# Patient Record
Sex: FEMALE | Race: WHITE | NOT HISPANIC OR LATINO | URBAN - METROPOLITAN AREA
[De-identification: names, ages, dates, MRNs, and addresses within clinical notes are randomized per-mention and may not be internally consistent; named-entity substitution may affect disease eponyms.]

---

## 2021-06-18 ENCOUNTER — EMERGENCY (EMERGENCY)
Facility: HOSPITAL | Age: 21
LOS: 0 days | Discharge: HOME | End: 2021-06-19
Attending: EMERGENCY MEDICINE | Admitting: EMERGENCY MEDICINE
Payer: COMMERCIAL

## 2021-06-18 VITALS
TEMPERATURE: 98 F | OXYGEN SATURATION: 99 % | HEART RATE: 101 BPM | SYSTOLIC BLOOD PRESSURE: 132 MMHG | DIASTOLIC BLOOD PRESSURE: 79 MMHG | RESPIRATION RATE: 22 BRPM | WEIGHT: 255.96 LBS

## 2021-06-18 DIAGNOSIS — R05 COUGH: ICD-10-CM

## 2021-06-18 DIAGNOSIS — J45.901 UNSPECIFIED ASTHMA WITH (ACUTE) EXACERBATION: ICD-10-CM

## 2021-06-18 DIAGNOSIS — Z91.018 ALLERGY TO OTHER FOODS: ICD-10-CM

## 2021-06-18 DIAGNOSIS — R06.2 WHEEZING: ICD-10-CM

## 2021-06-18 PROCEDURE — 99284 EMERGENCY DEPT VISIT MOD MDM: CPT

## 2021-06-18 RX ORDER — ALBUTEROL 90 UG/1
2 AEROSOL, METERED ORAL ONCE
Refills: 0 | Status: COMPLETED | OUTPATIENT
Start: 2021-06-18 | End: 2021-06-18

## 2021-06-18 RX ADMIN — ALBUTEROL 2 PUFF(S): 90 AEROSOL, METERED ORAL at 22:47

## 2021-06-18 NOTE — ED ADULT TRIAGE NOTE - CHIEF COMPLAINT QUOTE
Pt BIBA c/o asthma exacerbation. As per EMS, pt [employer] worker, running around in [place] area and sudden onset asthma attack. Airway intact. b/l wheezes noted. Pt received 2 neb tx in the field.

## 2021-06-18 NOTE — ED ADULT NURSE NOTE - OBJECTIVE STATEMENT
Pt states had an asthma attack after she was in a [place] area while working. States sudden onset. Received 2 nebs in field given by EMS. Pt states has not used her inhaler in a while.  Pt stable at this time, no distress noted, speaks in full sentences,

## 2021-06-19 VITALS
OXYGEN SATURATION: 99 % | SYSTOLIC BLOOD PRESSURE: 121 MMHG | HEART RATE: 87 BPM | DIASTOLIC BLOOD PRESSURE: 70 MMHG | RESPIRATION RATE: 18 BRPM | TEMPERATURE: 98 F

## 2021-06-19 PROCEDURE — 71046 X-RAY EXAM CHEST 2 VIEWS: CPT | Mod: 26

## 2021-06-19 NOTE — ED PROVIDER NOTE - PATIENT PORTAL LINK FT
You can access the FollowMyHealth Patient Portal offered by [facility] by registering at the following website: [email or web address]. By joining [employer]’s FollowMyHealth portal, you will also be able to view your health information using other applications (apps) compatible with our system.

## 2021-06-19 NOTE — ED PROVIDER NOTE - OBJECTIVE STATEMENT
the pt is a 20y F w/ hx of asthma presenting with intermittent cough for several weeks and today an episode of non-stop cough and wheezing while at work. She called 911 and en-route received 2 Duoneb treatments. On arrival to the ED, feels much better. Denies fever, headache, chest pain, active SOB, N/V, abdominal pain, diarrhea. Of note, states she ran out and doesn't have her albuterol inhaler.

## 2021-06-19 NOTE — ED PROVIDER NOTE - NS ED ROS FT
Constitutional: (-) fever  Eyes/ENT: (-) blurry vision, (-) epistaxis  Cardiovascular: (-) chest pain, (-) syncope  Respiratory: (+) cough, (+) wheezing, (-) shortness of breath  Gastrointestinal: (-) vomiting, (-) diarrhea  Musculoskeletal: (-) neck pain, (-) back pain, (-) joint pain  Integumentary: (-) rash, (-) edema  Neurological: (-) headache, (-) altered mental status  Psychiatric: (-) hallucinations  Allergic/Immunologic: (-) pruritus

## 2021-06-19 NOTE — ED PROVIDER NOTE - ATTENDING CONTRIBUTION TO CARE
I personally evaluated the patient. I reviewed the Resident’s or Physician Assistant’s note (as assigned above), and agree with the findings and plan except as documented in my note.  Pt with hx of asthma with complaints of 6 wks of intermittent coughing. No other symptoms. Reports some wheezing today that improved after she received albuterol nebs from EMS. On exam, pt is well appearing, normal s1s2, lungs ctab. Plan is albuterol inhaler, nebs and reassess.

## 2021-06-19 NOTE — ED PROVIDER NOTE - CLINICAL SUMMARY MEDICAL DECISION MAKING FREE TEXT BOX
Pt with 6 wks of coughing. +asthma exacerbation today. Required inhaler and CXR. No acute findings. Will be d/vandana with outpt f/up.

## 2021-06-19 NOTE — ED PROVIDER NOTE - NSFOLLOWUPCLINICS_GEN_ALL_ED_FT
[facility]  Medicine  [address], [place]   Phone: [phone]  Fax:   Follow Up Time: 7-10 Days

## 2021-07-24 ENCOUNTER — EMERGENCY (EMERGENCY)
Facility: HOSPITAL | Age: 21
LOS: 0 days | Discharge: HOME | End: 2021-07-24
Attending: STUDENT IN AN ORGANIZED HEALTH CARE EDUCATION/TRAINING PROGRAM | Admitting: EMERGENCY MEDICINE
Payer: COMMERCIAL

## 2021-07-24 VITALS
DIASTOLIC BLOOD PRESSURE: 80 MMHG | HEART RATE: 70 BPM | RESPIRATION RATE: 18 BRPM | SYSTOLIC BLOOD PRESSURE: 128 MMHG | OXYGEN SATURATION: 98 % | TEMPERATURE: 98 F

## 2021-07-24 DIAGNOSIS — J45.21 MILD INTERMITTENT ASTHMA WITH (ACUTE) EXACERBATION: ICD-10-CM

## 2021-07-24 DIAGNOSIS — R06.02 SHORTNESS OF BREATH: ICD-10-CM

## 2021-07-24 DIAGNOSIS — Z91.030 BEE ALLERGY STATUS: ICD-10-CM

## 2021-07-24 PROCEDURE — 99284 EMERGENCY DEPT VISIT MOD MDM: CPT

## 2021-07-24 RX ORDER — ALBUTEROL 90 UG/1
4 AEROSOL, METERED ORAL ONCE
Refills: 0 | Status: COMPLETED | OUTPATIENT
Start: 2021-07-24 | End: 2021-07-24

## 2021-07-24 RX ORDER — ALBUTEROL 90 UG/1
2 AEROSOL, METERED ORAL
Qty: 1 | Refills: 0
Start: 2021-07-24

## 2021-07-24 RX ORDER — DEXAMETHASONE 0.5 MG/5ML
10 ELIXIR ORAL ONCE
Refills: 0 | Status: COMPLETED | OUTPATIENT
Start: 2021-07-24 | End: 2021-07-24

## 2021-07-24 RX ADMIN — Medication 10 MILLIGRAM(S): at 10:12

## 2021-07-24 RX ADMIN — ALBUTEROL 4 PUFF(S): 90 AEROSOL, METERED ORAL at 10:01

## 2021-07-24 NOTE — ED PROVIDER NOTE - CARE PROVIDER_API CALL
Jase Eagle)  Critical Care Medicine; Pulmonary Disease; Sleep Medicine  [address] [place] [address]  Phone: [phone]  Fax: [phone]  Follow Up Time: 7-10 Days

## 2021-07-24 NOTE — ED PEDIATRIC NURSE NOTE - PRO INTERPRETER NEED 2
----- Message from Hayde Sidhu sent at 9/30/2020 11:53 AM CDT -----  .Type:  Sooner Apoointment Request    Caller is requesting a sooner appointment.  Caller declined first available appointment listed below.  Caller will not accept being placed on the waitlist and is requesting a message be sent to doctor.  Name of Caller:Cristina Saini  When is the first available appointment?01/07/2021  Symptoms: kidney/abnormal labs  Would the patient rather a call back or a response via [facility]? Call back  Best Call Back Number: [phone]  Additional Information:        English

## 2021-07-24 NOTE — ED PROVIDER NOTE - CLINICAL SUMMARY MEDICAL DECISION MAKING FREE TEXT BOX
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 No other symptoms. No nebs via EMS, patient denies any complaints at this time. Patient states inhaler at home needs refill. On exam, pt is well appearing, normal s1s2, lungs ctab. Plan is albuterol inhaler, decadron and albuterol inhaler script sent to pharmacy and follow up with medicine clinic.

## 2021-07-24 NOTE — ED PROVIDER NOTE - NS ED ROS FT
Consitutional: No fever, chills,   Cardiac:  No chest pain,  Respiratory: +cough +sob  GI:  No abdominal pain, nausea, vomiting diarrhea  Neuro:  No headache   Skin:  No skin rash

## 2021-07-24 NOTE — ED PROVIDER NOTE - OBJECTIVE STATEMENT
20yoF w/ pmhx of asthma who bibems for asthma excerbation 1hr PTA while working at [employer]. no recent illness, fever chills nausea vomiting cp. she had difficulty breathing while working, called EMS. she had sob before but it has since improved without intervention. she is requesting an albuterol pump here because she has not pmd since moving to [place] for past 1 year. she only uses albuterol for her asthma as needed, no maintenance therapy, never admitted to hospital for asthma. 20yoF w/ pmhx of asthma who bibems for asthma excerbation 1hr PTA while working at [employer]. no recent illness, fever chills nausea vomiting cp. she had difficulty breathing while working, called EMS. she had sob before but it has since improved without intervention. she is requesting an albuterol pump here because she has not pmd since moving to [place] for past 1 year. she only uses albuterol for her asthma as needed, no maintenance therapy, never admitted to hospital for asthma. she has dry cough for past few weeks as well.

## 2021-07-24 NOTE — ED PROVIDER NOTE - PATIENT PORTAL LINK FT
You can access the FollowMyHealth Patient Portal offered by [facility] by registering at the following website: [email or web address]. By joining [employer]’s FollowMyHealth portal, you will also be able to view your health information using other applications (apps) compatible with our system.

## 2021-07-24 NOTE — ED PROVIDER NOTE - PHYSICAL EXAMINATION
CONSTITUTIONAL: well appearing  SKIN: warm, dr  ENT: No nasal discharge  CARD: RRR  RESP: No wheezes, rales or rhonchi ctab, good air movement no accessory muscle use  ABD: soft ntnd  EXT: No cyanosis or edema  NEURO: Alert, oriented, grossly unremarkable  PSYCH: Cooperative, appropriate CONSTITUTIONAL: well appearing  SKIN: warm, dry  ENT: No nasal discharge  CARD: RRR  RESP: No wheezes, rales or rhonchi ctab, good air movement no accessory muscle use  ABD: soft ntnd  EXT: No cyanosis or edema  NEURO: Alert, oriented, grossly unremarkable  PSYCH: Cooperative, appropriate

## 2021-07-24 NOTE — ED PROVIDER NOTE - NSFOLLOWUPCLINICS_GEN_ALL_ED_FT
[facility]  Medicine  [address], [place]   Phone: [phone]  Fax:   Follow Up Time: 4-6 Days

## 2021-12-29 NOTE — ED ADULT NURSE NOTE - TEMPLATE
· Hemoglobin 10 8 on admission  · Likely multifactorial in setting of iron deficiency anemia vs CKD  · Baseline appears to be around 11 5 - 13  · Concurrently with no signs of active bleeding  · Continue home iron supplementation  · Transfuse for hemoglobin < 7 or with hemodynamic compromise  · Trend hemoglobin and monitor for signs of active bleeding General

## 2022-03-03 ENCOUNTER — EMERGENCY (EMERGENCY)
Facility: HOSPITAL | Age: 22
LOS: 0 days | Discharge: HOME | End: 2022-03-04
Attending: EMERGENCY MEDICINE | Admitting: EMERGENCY MEDICINE
Payer: COMMERCIAL

## 2022-03-03 VITALS
SYSTOLIC BLOOD PRESSURE: 134 MMHG | WEIGHT: 289.91 LBS | RESPIRATION RATE: 17 BRPM | TEMPERATURE: 98 F | DIASTOLIC BLOOD PRESSURE: 86 MMHG | HEART RATE: 94 BPM | OXYGEN SATURATION: 98 %

## 2022-03-03 DIAGNOSIS — I45.10 UNSPECIFIED RIGHT BUNDLE-BRANCH BLOCK: ICD-10-CM

## 2022-03-03 DIAGNOSIS — Z20.822 CONTACT WITH AND (SUSPECTED) EXPOSURE TO COVID-19: ICD-10-CM

## 2022-03-03 DIAGNOSIS — R05.9 COUGH, UNSPECIFIED: ICD-10-CM

## 2022-03-03 DIAGNOSIS — Z91.030 BEE ALLERGY STATUS: ICD-10-CM

## 2022-03-03 DIAGNOSIS — R06.02 SHORTNESS OF BREATH: ICD-10-CM

## 2022-03-03 DIAGNOSIS — J45.901 UNSPECIFIED ASTHMA WITH (ACUTE) EXACERBATION: ICD-10-CM

## 2022-03-03 LAB
ALBUMIN SERPL ELPH-MCNC: 4.5 G/DL — SIGNIFICANT CHANGE UP (ref 3.5–5.2)
ALP SERPL-CCNC: 72 U/L — SIGNIFICANT CHANGE UP (ref 30–115)
ALT FLD-CCNC: 16 U/L — SIGNIFICANT CHANGE UP (ref 0–41)
ANION GAP SERPL CALC-SCNC: 11 MMOL/L — SIGNIFICANT CHANGE UP (ref 7–14)
AST SERPL-CCNC: 15 U/L — SIGNIFICANT CHANGE UP (ref 0–41)
BASOPHILS # BLD AUTO: 0.06 K/UL — SIGNIFICANT CHANGE UP (ref 0–0.2)
BASOPHILS NFR BLD AUTO: 0.5 % — SIGNIFICANT CHANGE UP (ref 0–1)
BILIRUB SERPL-MCNC: 0.2 MG/DL — SIGNIFICANT CHANGE UP (ref 0.2–1.2)
BUN SERPL-MCNC: 8 MG/DL — LOW (ref 10–20)
CALCIUM SERPL-MCNC: 8.9 MG/DL — SIGNIFICANT CHANGE UP (ref 8.5–10.1)
CHLORIDE SERPL-SCNC: 102 MMOL/L — SIGNIFICANT CHANGE UP (ref 98–110)
CO2 SERPL-SCNC: 22 MMOL/L — SIGNIFICANT CHANGE UP (ref 17–32)
CREAT SERPL-MCNC: 0.5 MG/DL — LOW (ref 0.7–1.5)
EGFR: 137 ML/MIN/1.73M2 — SIGNIFICANT CHANGE UP
EOSINOPHIL # BLD AUTO: 0.57 K/UL — SIGNIFICANT CHANGE UP (ref 0–0.7)
EOSINOPHIL NFR BLD AUTO: 4.4 % — SIGNIFICANT CHANGE UP (ref 0–8)
GLUCOSE SERPL-MCNC: 86 MG/DL — SIGNIFICANT CHANGE UP (ref 70–99)
HCG SERPL QL: NEGATIVE — SIGNIFICANT CHANGE UP
HCT VFR BLD CALC: 36.7 % — LOW (ref 37–47)
HGB BLD-MCNC: 11.6 G/DL — LOW (ref 12–16)
IMM GRANULOCYTES NFR BLD AUTO: 0.2 % — SIGNIFICANT CHANGE UP (ref 0.1–0.3)
LYMPHOCYTES # BLD AUTO: 28 % — SIGNIFICANT CHANGE UP (ref 20.5–51.1)
LYMPHOCYTES # BLD AUTO: 3.61 K/UL — HIGH (ref 1.2–3.4)
MCHC RBC-ENTMCNC: 25.3 PG — LOW (ref 27–31)
MCHC RBC-ENTMCNC: 31.6 G/DL — LOW (ref 32–37)
MCV RBC AUTO: 80.1 FL — LOW (ref 81–99)
MONOCYTES # BLD AUTO: 0.87 K/UL — HIGH (ref 0.1–0.6)
MONOCYTES NFR BLD AUTO: 6.8 % — SIGNIFICANT CHANGE UP (ref 1.7–9.3)
NEUTROPHILS # BLD AUTO: 7.73 K/UL — HIGH (ref 1.4–6.5)
NEUTROPHILS NFR BLD AUTO: 60.1 % — SIGNIFICANT CHANGE UP (ref 42.2–75.2)
NRBC # BLD: 0 /100 WBCS — SIGNIFICANT CHANGE UP (ref 0–0)
NT-PROBNP SERPL-SCNC: 16 PG/ML — SIGNIFICANT CHANGE UP (ref 0–300)
PLATELET # BLD AUTO: 303 K/UL — SIGNIFICANT CHANGE UP (ref 130–400)
POTASSIUM SERPL-MCNC: 4 MMOL/L — SIGNIFICANT CHANGE UP (ref 3.5–5)
POTASSIUM SERPL-SCNC: 4 MMOL/L — SIGNIFICANT CHANGE UP (ref 3.5–5)
PROT SERPL-MCNC: 7.2 G/DL — SIGNIFICANT CHANGE UP (ref 6–8)
RBC # BLD: 4.58 M/UL — SIGNIFICANT CHANGE UP (ref 4.2–5.4)
RBC # FLD: 14.6 % — HIGH (ref 11.5–14.5)
SODIUM SERPL-SCNC: 135 MMOL/L — SIGNIFICANT CHANGE UP (ref 135–146)
TROPONIN T SERPL-MCNC: <0.01 NG/ML — SIGNIFICANT CHANGE UP
WBC # BLD: 12.87 K/UL — HIGH (ref 4.8–10.8)
WBC # FLD AUTO: 12.87 K/UL — HIGH (ref 4.8–10.8)

## 2022-03-03 PROCEDURE — 93010 ELECTROCARDIOGRAM REPORT: CPT

## 2022-03-03 PROCEDURE — 99285 EMERGENCY DEPT VISIT HI MDM: CPT

## 2022-03-03 PROCEDURE — 71045 X-RAY EXAM CHEST 1 VIEW: CPT | Mod: 26

## 2022-03-03 RX ORDER — IPRATROPIUM/ALBUTEROL SULFATE 18-103MCG
3 AEROSOL WITH ADAPTER (GRAM) INHALATION
Refills: 0 | Status: COMPLETED | OUTPATIENT
Start: 2022-03-03 | End: 2022-03-03

## 2022-03-03 RX ADMIN — Medication 125 MILLIGRAM(S): at 23:06

## 2022-03-03 RX ADMIN — Medication 3 MILLILITER(S): at 22:50

## 2022-03-03 NOTE — ED PROVIDER NOTE - NS ED ROS FT
Constitutional: (-) fever (-) chills (-) (-) lightheadedness   Eyes/ENT: (-) blurry vision, (-) epistaxis (-) rhinorrhea (-) nasal congestion  Cardiovascular: (-) chest pain, (-) syncope (-) palpitations   Respiratory: (-) cough, (+) shortness of breath (-) pleurisy   Gastrointestinal: (-) vomiting, (-) diarrhea (-) abdominal pain (-) nausea (-) anorexia  Musculoskeletal: (-) neck pain, (-) back pain, (-) joint pain (-) joint swelling (-) painful ROM  Integumentary: (-) rash, (-) edema (-) lacerations (-) pruritis   Neurological: (-) headache, (-) altered mental status (-) LOC (-) dizziness (-) paresthesias (-) gait abnormalities

## 2022-03-03 NOTE — ED PROVIDER NOTE - PATIENT PORTAL LINK FT
You can access the FollowMyHealth Patient Portal offered by [facility] by registering at the following website: [email or web address]. By joining [employer]’s FollowMyHealth portal, you will also be able to view your health information using other applications (apps) compatible with our system.

## 2022-03-03 NOTE — ED PROVIDER NOTE - ATTENDING CONTRIBUTION TO CARE
21-year-old female with history of asthma, has been out of her albuterol, no other PMHx, presents with concern for asthma exacerbation. She reports feeling cough, congestion, rhinorrhea, since Saturday. states it was exacerbated today at work where there is a lot of dust and she is running around a lot which exacerbates her asthma. Since then feeling some shortness of breath and midsternal chest tightness. She reports 3 past visits for the same symptoms that resolved with nebulized treatments and steroids. On exam, afebrile, hemodynamically stable, saturating well on room air, NAD, well appearing, sitting comfortably in bed, intermittent coughing, noted nasal congestion, no WOB, speaking full sentences, head NCAT, EOMI grossly, anicteric, MMM, no JVD or HJR, RRR, nml S1/S2, no m/r/g, lungs scattered wheezes, no r/r, abd soft, NT, ND, nml BS, no rebound or guarding, AAO, CN's 3-12 grossly intact, JARA spontaneously, no leg cyanosis or edema, skin warm, well perfused, no rashes or hives. Character low suspicion for PE and no tachycardia, hypoxia, or e/o DVT or acute risk factors for this. Character low suspicion for ACS and ECG/trop ____________. No e/o PNA, PTX, or fluid overload on exam or CXR. ____________ Character, exam, appearance c/w exacerbation of her established asthma. Given Duonebs, solumedrol with _________. 21-year-old female with history of asthma, has been out of her albuterol, no other PMHx, presents with concern for asthma exacerbation. She reports feeling cough, congestion, rhinorrhea, since Saturday. states it was exacerbated today at work where there is a lot of dust and she is running around a lot which exacerbates her asthma. Since then feeling some shortness of breath and midsternal chest tightness. She reports 3 past visits for the same symptoms that resolved with nebulized treatments and steroids. Never had COVID. COVID booster 2 mths ago. On exam, afebrile, hemodynamically stable, saturating well on room air, NAD, well appearing, sitting comfortably in bed, intermittent coughing, noted nasal congestion, no WOB, speaking full sentences, head NCAT, EOMI grossly, anicteric, MMM, no JVD or HJR, RRR, nml S1/S2, no m/r/g, lungs scattered wheezes, no r/r, abd soft, NT, ND, nml BS, no rebound or guarding, AAO, CN's 3-12 grossly intact, JARA spontaneously, no leg cyanosis or edema, skin warm, well perfused, no rashes or hives. Character low suspicion for PE and no tachycardia, hypoxia, or e/o DVT or acute risk factors for this. Character low suspicion for ACS and ECG/trop ____________. No e/o PNA, PTX, or fluid overload on exam or CXR. No e/o myocarditis. ____________ Character, exam, appearance c/w exacerbation of her established asthma. Given Duonebs, solumedrol with _________. 21-year-old female with history of asthma, has been out of her albuterol, no other PMHx, presents with concern for asthma exacerbation. She reports feeling cough, congestion, rhinorrhea, since Saturday. states it was exacerbated today at work where there is a lot of dust and she is running around a lot which exacerbates her asthma. Since then feeling some shortness of breath and midsternal chest tightness. She reports 3 past visits for the same symptoms that resolved with nebulized treatments and steroids. Never had COVID. COVID booster 2 mths ago. On exam, afebrile, hemodynamically stable, saturating well on room air, NAD, well appearing, sitting comfortably in bed, intermittent coughing, noted nasal congestion, no WOB, speaking full sentences, head NCAT, EOMI grossly, anicteric, MMM, no JVD or HJR, RRR, nml S1/S2, no m/r/g, lungs scattered wheezes, no r/r, abd soft, NT, ND, nml BS, no rebound or guarding, AAO, CN's 3-12 grossly intact, JARA spontaneously, no leg cyanosis or edema, skin warm, well perfused, no rashes or hives. Character low suspicion for PE and no tachycardia, hypoxia, or e/o DVT or acute risk factors for this. Character low suspicion for ACS and ECG/trop unremarkable. No e/o PNA, PTX, or fluid overload on exam or CXR. No e/o myocarditis. Character, exam, appearance c/w exacerbation of her established asthma. Given Duonebs, solumedrol with resolution of symptoms as well as wheezing and has great air mvmt on rpt pulm exam. Patient is well appearing, NAD, afebrile, hemodynamically stable. Any available tests and studies were discussed with patient. Discharged with rx alb, steroids, instructions in further symptomatic care, return precautions, and need for PMD f/u.

## 2022-03-03 NOTE — ED PROVIDER NOTE - PHYSICAL EXAMINATION
Physical Exam    Vital Signs: I have reviewed the initial vital signs.  Constitutional: well-nourished, appears stated age, no acute distress  Eyes: Conjunctiva pink, Sclera clear  Cardiovascular: S1 and S2, regular rate, regular rhythm, well-perfused extremities, radial pulses equal and 2+, calves nonttp, equal in size  Respiratory: unlabored respiratory effort, speaking in full sentences, handling oral secretions, clear to auscultation bilaterally no wheezing, rales and rhonchi  Gastrointestinal: soft, non-tender abdomen, no pulsatile mass, normal bowl sounds  Musculoskeletal: supple neck, no lower extremity edema  Integumentary: warm, dry, no rashes, lacerations,  Neurologic: awake, alert

## 2022-03-03 NOTE — ED PROVIDER NOTE - CLINICAL SUMMARY MEDICAL DECISION MAKING FREE TEXT BOX
21-year-old female with history of asthma, has been out of her albuterol, no other PMHx, presents with concern for asthma exacerbation. She reports feeling cough, congestion, rhinorrhea, since Saturday. states it was exacerbated today at work where there is a lot of dust and she is running around a lot which exacerbates her asthma. Since then feeling some shortness of breath and midsternal chest tightness. She reports 3 past visits for the same symptoms that resolved with nebulized treatments and steroids. Never had COVID. COVID booster 2 mths ago. On exam, afebrile, hemodynamically stable, saturating well on room air, NAD, well appearing, sitting comfortably in bed, intermittent coughing, noted nasal congestion, no WOB, speaking full sentences, head NCAT, EOMI grossly, anicteric, MMM, no JVD or HJR, RRR, nml S1/S2, no m/r/g, lungs scattered wheezes, no r/r, abd soft, NT, ND, nml BS, no rebound or guarding, AAO, CN's 3-12 grossly intact, JARA spontaneously, no leg cyanosis or edema, skin warm, well perfused, no rashes or hives. Character low suspicion for PE and no tachycardia, hypoxia, or e/o DVT or acute risk factors for this. Character low suspicion for ACS and ECG/trop unremarkable. No e/o PNA, PTX, or fluid overload on exam or CXR. No e/o myocarditis. Character, exam, appearance c/w exacerbation of her established asthma. Given Duonebs, solumedrol with resolution of symptoms as well as wheezing and has great air mvmt on rpt pulm exam. Patient is well appearing, NAD, afebrile, hemodynamically stable. Any available tests and studies were discussed with patient. Discharged with rx alb, steroids, instructions in further symptomatic care, return precautions, and need for PMD f/u.

## 2022-03-03 NOTE — ED PROVIDER NOTE - NSFOLLOWUPINSTRUCTIONS_ED_ALL_ED_FT
MAKE AN APPOINTMENT WITH YOUR PRIMARY DOCTOR WITHIN 1-3 DAYS OF YOUR ER VISIT. RETURN TO THE ER IF YOU REQUIRE MORE THAN 2 PUFFS OF RESCUE INHALER WITHIN 1 HOUR.    Asthma, Pediatric  Asthma is a long-term (chronic) condition that causes repeated (recurrent) swelling and narrowing of the airways. The airways are the passages that lead from the nose and mouth down into the lungs. When asthma symptoms get worse, it is called an asthma flare. When this happens, it can be difficult for your child to breathe. Asthma flares can range from minor to life-threatening.    ImageAsthma cannot be cured, but medicines and lifestyle changes can help to control your child's asthma symptoms. It is important to keep your child's asthma well controlled in order to decrease how much this condition interferes with his or her daily life.    What are the causes?  The exact cause of asthma is not known. It is most likely caused by family (genetic) inheritance and exposure to a combination of environmental factors early in life.    There are many things that can bring on an asthma flare (triggers) or make asthma symptoms worse.    Mold.  Dust.  Smoke.  Outdoor air pollutants, such as engine exhaust, wood smoke, or smog.  Indoor air pollutants, such as aerosol sprays and fumes from household .  Things that cause allergy symptoms (allergens), such as animal dander and pollen from grasses or trees.  Sulfites in foods and drinks. Foods and drinks that may contain sulfites include dried fruit, potato chips, and sparkling grape juice.  Household pests, including dust mites and cockroaches.  Infections that affect the airways, such as common cold or flu.  Very cold, dry, or humid air.  Stress or strong emotions.  Exercise or strenuous activity.    What increases the risk?  Your child may have an increased risk of asthma if:    He or she has had certain types of repeated lung (respiratory) infections.  He or she has seasonal allergies or an allergic skin condition (eczema).  One or both parents have allergies or asthma.    What are the signs or symptoms?  Symptoms may vary depending on the child and his or her asthma flare triggers. Common symptoms include:    Wheezing.  Trouble breathing (shortness of breath).  Nighttime or early morning coughing.  Frequent or severe coughing with a common cold.  Chest tightness.  Difficulty talking in complete sentences during an asthma flare.  Poor exercise tolerance.    How is this diagnosed?  Asthma is diagnosed with a physical exam and medical history. Tests that may be done include:    Lung function studies (spirometry). These tests check for the flow of air in your lungs.  Allergy tests.  Imaging tests, such as X-rays.    How is this treated?  Treatment for asthma involves:    Identifying and avoiding your child’s asthma triggers.  Medicines. Two types of medicines are commonly used to treat asthma:    Controller medicines. These help prevent asthma symptoms from occurring. They are usually taken every day.  Fast-acting reliever or rescue medicines. These quickly relieve asthma symptoms. They are used as needed and provide short-term relief.    Using supplemental oxygen. This may be needed during a severe episode.  Using other medicines, such as:    Allergy medicines, such as antihistamines, if your asthma attacks are triggered by allergens.  Immune medicines (immunomodulators). These are medicines that help control the body's defense (immune) system.      Your child’s health care provider will help you create a written plan for managing and treating your child's asthma flares (asthma action plan). This plan includes:    A list of your child’s asthma triggers and how to avoid them.  Information on when medicines should be taken and when to change their dosage.    ImageAn action plan also involves using a device that measures how well your child’s lungs are working (peak flow meter). Often, your child’s peak flow number will start to go down before you or your child recognizes asthma flare symptoms.    Follow these instructions at home:  Trigger Avoidance     Once your child’s asthma triggers have been identified, take actions to avoid them. This may include avoiding excessive or prolonged exposure to:    Dust and mold.    Dust and vacuum your home 1–2 times per week while your child is not home. Use a high-efficiency particulate arrestance (HEPA) vacuum, if possible.  Replace carpet with wood, tile, or vinyl abelino, if possible.  Change your heating and air conditioning filter at least once a month. Use a HEPA filter, if possible.  Throw away plants if you see mold on them.  Clean bathrooms and palmer with bleach. Repaint the walls in these rooms with mold-resistant paint. Keep your child out of these rooms while you are cleaning and painting.  Limit your child's plush toys or stuffed animals to 1–2. Wash them monthly with hot water and dry them in a dryer.  Use allergy-proof bedding, including pillows, mattress covers, and box spring covers.  Wash bedding every week in hot water and dry it in a dryer.  Use blankets that are made of polyester or cotton.    Pet dander. Have your child avoid contact with any animals that he or she is allergic to.  Allergens and pollens from any grasses, trees, or other plants that your child is allergic to. Have your child avoid spending a lot of time outdoors when pollen counts are high, air quality is low, and on very windy days.  Foods that contain high amounts of sulfites.  Strong odors, chemicals, and fumes.  Smoke.    Do not allow your child to smoke. Talk to your child about the risks of smoking.  Have your child avoid exposure to smoke. This includes campfire smoke, forest fire smoke, and secondhand smoke from tobacco products. Do not smoke or allow others to smoke in your home or around your child.    Household pests and pest droppings, including dust mites and cockroaches.  Certain medicines, including NSAIDs. Always talk to your child’s health care provider before stopping or starting any new medicines.    Making sure that you, your child, and all household members wash their hands frequently will also help to control some triggers. If soap and water are not available, use hand .    General instructions     Give over-the-counter and prescription medicines only as told by your child’s health care provider.  Make sure you stay up to date on your child's vaccinations as told by your health care provider. This may include vaccines for the flu and pneumonia.  Use a peak flow meter as told by your child’s health care provider. Record and keep track of your child's peak flow readings.  Understand and use the asthma action plan to address an asthma flare. Make sure that all people providing care for your child:    Have a copy of the asthma action plan.  Understand what to do during an asthma flare.  Have access to any needed medicines, if this applies.    Keep all follow-up visits as told by your child’s health care provider. This is important.  Where to find more information  For information about asthma, turn to the Centers for Disease Control and Prevention at www.cdc.gov/asthma/faqs.htm  For air quality information, turn to [employer] at https://airnow.gov/  Contact a health care provider if:  Your child has wheezing, shortness of breath, or a cough that is not responding to medicines.  The mucus your child coughs up (sputum) is yellow, green, gray, bloody, or thicker than usual.  Your child’s medicines are causing side effects, such as a rash, itching, swelling, or trouble breathing.  Your child needs reliever medicines more often than 2–3 times per week.  Your child's peak flow measurement is at 50–79% of his or her personal best (yellow zone) after following his or her asthma action plan for 1 hour.  Your child has a fever.  Get help right away if:  Your child's peak flow is less than 50% of his or her personal best (red zone).  Your child is getting worse and does not respond to treatment during an asthma flare.  Your child is short of breath at rest or when doing very little physical activity.  Your child has difficulty eating, drinking, or talking.  Your child has chest pain.  Your child’s lips or fingernails look bluish.  Your child is light-headed or dizzy, or he or she faints.  Your child who is younger than 3 months has a temperature of 100°F (38°C) or higher.  Summary  Asthma is a long-term (chronic) condition that causes recurrent episodes in which the airways become tight and narrow. Asthma episodes, also called asthma attacks, can cause coughing, wheezing, shortness of breath, and chest pain.  Asthma cannot be cured, but medicines and lifestyle changes can help control it and treat acute attacks.  Make sure you understand how to help avoid triggers and how and when your child should use medicines.  Asthma attacks can range from minor to life threatening. Get help right away if your child has an asthma attack and does not respond to treatment with usual rescue medicines.

## 2022-03-03 NOTE — ED PROVIDER NOTE - PROGRESS NOTE DETAILS
dc: pt reassessed: no wheezing noted. Is comfortable. Rx sent for albuterol and steroids. Verbalized return precautions. Pt will receive covid results. Will DC.

## 2022-03-03 NOTE — ED PROVIDER NOTE - OBJECTIVE STATEMENT
21 y.o. F, pmh asthma no intubations or hospitalizations here for difficulty breathing while at work.  + sob, had no inhaler. Ran out of rescue inhaler. Denies being given treatments by EMS in route.  + cough and nasal congestion for several days. No cp, palpitations calf swelling dizziness. Is speaking in full sentences and eating in the ER.

## 2022-03-04 PROBLEM — J45.909 UNSPECIFIED ASTHMA, UNCOMPLICATED: Chronic | Status: ACTIVE | Noted: 2021-07-24

## 2022-03-04 LAB
HIV 1 & 2 AB SERPL IA.RAPID: SIGNIFICANT CHANGE UP
SARS-COV-2 RNA SPEC QL NAA+PROBE: SIGNIFICANT CHANGE UP

## 2022-03-04 RX ORDER — ALBUTEROL 90 UG/1
2 AEROSOL, METERED ORAL
Qty: 1 | Refills: 0
Start: 2022-03-04

## 2022-03-04 RX ADMIN — Medication 3 MILLILITER(S): at 00:00

## 2022-03-04 RX ADMIN — Medication 3 MILLILITER(S): at 00:25

## 2022-04-20 NOTE — ED ADULT TRIAGE NOTE - MODE OF ARRIVAL
Patient's Name/Date of Birth: Elena Lafleur / 1978    Date: 4/20/2022    PCP: Reema Martinez MD    Chief Complaint   Patient presents with    New Patient    Mass     perianal abscess        HPI:  Patient seen for evaluation of right thigh soft tissue mass, has had for some time, increasing in size, painful. Had infection 2 weeks ago    Patient's medications, allergies, past medical, surgical, social and family histories were reviewed and updated as appropriate. No Known Allergies    No past medical history on file. Past Surgical History:   Procedure Laterality Date    TUBAL LIGATION          Social History     Tobacco Use    Smoking status: Current Every Day Smoker     Packs/day: 1.00     Years: 21.00     Pack years: 21.00     Types: Cigarettes    Smokeless tobacco: Never Used   Substance Use Topics    Alcohol use: Yes     Alcohol/week: 2.0 standard drinks     Types: 2 Cans of beer per week     Comment: socially       Current Outpatient Medications   Medication Sig Dispense Refill    cephALEXin (KEFLEX) 500 MG capsule take 1 capsule by mouth every 6 hours until finished      sulfamethoxazole-trimethoprim (BACTRIM DS;SEPTRA DS) 800-160 MG per tablet take 1 tablet by mouth twice a day      oxyCODONE-acetaminophen (PERCOCET) 5-325 MG per tablet Take 1-2 tablets by mouth every 4 hours as needed for Pain (For moderate pain level 4-6). 60 tablet 0    docusate sodium 100 MG CAPS Take 100 mg by mouth 2 times daily. 60 capsule 0    Cranberry (AZO-CRANBERRY) 450 MG TABS Take 1 tablet by mouth daily. No current facility-administered medications for this visit.         Review of Systems  Constitutional: negative  Eyes: negative  Ears, nose, mouth, throat, and face: negative  Respiratory: negative  Cardiovascular: negative  Gastrointestinal: negative  Genitourinary:negative  Integument/breast: negative  Hematologic/lymphatic: negative  Musculoskeletal:negative  Neurological: negative  Allergic/Immunologic: negative    Physical exam:  BP (!) 153/82   Pulse 107   Temp 97.3 °F (36.3 °C)   Resp 18   Ht 5' 7\" (1.702 m)   Wt 168 lb (76.2 kg)   SpO2 96%   BMI 26.31 kg/m²   General appearance: no acute distress  Head:NCAT, EOMI, PERRLA, conjunctiva pink  Neck: no masses, supple  Lungs: CTABL  Heart: RRR  Abdomen: soft, nondistended, nontender, no guarding, no peritoneal signs, normoactive bowel sounds  Extremities:no edema  Right thigh: 3.5 cm soft tissue mass consistent with lipoma  Neuro exam: normal  Assessment/Plan:  .proceed with excision right thigh mass  The procedure risks, benfits, possible complications and alternative options where explained to the patient, hse understands and agrees to proceed with surgery. No follow-ups on file.     Ashlyn Somers MD      Send copy of H&P to PCP, [facility]

## 2022-06-23 NOTE — ED PROVIDER NOTE - IV ALTEPLASE DOOR HIDDEN
show
petra from home with c/o woke up with dizziness,vomiting and weak, last night before going to bed took his medicine xarelto x2 and renexa x 2 by mistake,xarelto 10 mg. each and ranexa 1,000 m. as per ems

## 2023-04-05 NOTE — ED ADULT NURSE NOTE - IS THE PATIENT ABLE TO BE SCREENED?
Patient instructed on:  NPO after midnight.  Bring insurance card(s), photo ID, and phone number of ride.  No jewelery or body piercing's.  Wear comfortable, loose clothing appropriate for the procedure.  Advised to have someone home with her after the procedure.  Procedural prep instructions received and reviewed.  Aware of medication(s) to take DOS with sip of water and/or hold per anesthesia and surgeon guidelines.  To bring eye drops  To bring a form of payment if requested to pay a portion of bill at registration.      COVID Patient Instructions Given:    -Mask must be worn and stay on.  Visitor must also wear a mask.  -Visitors/ can drop off and , or may wait in car, but need to provide phone number so they can be contacted when procedure is finished  -Reading materials (magazines & newspapers) and beverages are not provided in the waiting areas.  Ok to bring something in from home.  -Patient advised to contact surgeon for any illness concerns    Patient verbalizes understanding.        Yes

## 2023-05-06 ENCOUNTER — EMERGENCY (EMERGENCY)
Facility: HOSPITAL | Age: 23
LOS: 0 days | Discharge: HOME | End: 2023-05-06
Attending: STUDENT IN AN ORGANIZED HEALTH CARE EDUCATION/TRAINING PROGRAM
Payer: COMMERCIAL

## 2023-05-06 VITALS
SYSTOLIC BLOOD PRESSURE: 127 MMHG | RESPIRATION RATE: 19 BRPM | TEMPERATURE: 99 F | OXYGEN SATURATION: 100 % | HEART RATE: 77 BPM | WEIGHT: 293 LBS | DIASTOLIC BLOOD PRESSURE: 61 MMHG

## 2023-05-06 VITALS
HEART RATE: 73 BPM | OXYGEN SATURATION: 98 % | DIASTOLIC BLOOD PRESSURE: 67 MMHG | RESPIRATION RATE: 18 BRPM | SYSTOLIC BLOOD PRESSURE: 120 MMHG | TEMPERATURE: 98 F

## 2023-05-06 DIAGNOSIS — R10.9 UNSPECIFIED ABDOMINAL PAIN: ICD-10-CM

## 2023-05-06 DIAGNOSIS — R11.2 NAUSEA WITH VOMITING, UNSPECIFIED: ICD-10-CM

## 2023-05-06 DIAGNOSIS — J45.909 UNSPECIFIED ASTHMA, UNCOMPLICATED: ICD-10-CM

## 2023-05-06 DIAGNOSIS — Z91.030 BEE ALLERGY STATUS: ICD-10-CM

## 2023-05-06 DIAGNOSIS — R42 DIZZINESS AND GIDDINESS: ICD-10-CM

## 2023-05-06 LAB
ALBUMIN SERPL ELPH-MCNC: 4.2 G/DL — SIGNIFICANT CHANGE UP (ref 3.5–5.2)
ALP SERPL-CCNC: 85 U/L — SIGNIFICANT CHANGE UP (ref 30–115)
ALT FLD-CCNC: 17 U/L — SIGNIFICANT CHANGE UP (ref 0–41)
ANION GAP SERPL CALC-SCNC: 12 MMOL/L — SIGNIFICANT CHANGE UP (ref 7–14)
APPEARANCE UR: CLEAR — SIGNIFICANT CHANGE UP
AST SERPL-CCNC: 17 U/L — SIGNIFICANT CHANGE UP (ref 0–41)
BACTERIA # UR AUTO: ABNORMAL
BASOPHILS # BLD AUTO: 0.06 K/UL — SIGNIFICANT CHANGE UP (ref 0–0.2)
BASOPHILS NFR BLD AUTO: 0.5 % — SIGNIFICANT CHANGE UP (ref 0–1)
BILIRUB SERPL-MCNC: 0.3 MG/DL — SIGNIFICANT CHANGE UP (ref 0.2–1.2)
BILIRUB UR-MCNC: NEGATIVE — SIGNIFICANT CHANGE UP
BUN SERPL-MCNC: 13 MG/DL — SIGNIFICANT CHANGE UP (ref 10–20)
CALCIUM SERPL-MCNC: 9.2 MG/DL — SIGNIFICANT CHANGE UP (ref 8.4–10.5)
CHLORIDE SERPL-SCNC: 104 MMOL/L — SIGNIFICANT CHANGE UP (ref 98–110)
CO2 SERPL-SCNC: 24 MMOL/L — SIGNIFICANT CHANGE UP (ref 17–32)
COLOR SPEC: YELLOW — SIGNIFICANT CHANGE UP
CREAT SERPL-MCNC: 0.6 MG/DL — LOW (ref 0.7–1.5)
DIFF PNL FLD: NEGATIVE — SIGNIFICANT CHANGE UP
EGFR: 130 ML/MIN/1.73M2 — SIGNIFICANT CHANGE UP
EOSINOPHIL # BLD AUTO: 0.73 K/UL — HIGH (ref 0–0.7)
EOSINOPHIL NFR BLD AUTO: 5.6 % — SIGNIFICANT CHANGE UP (ref 0–8)
EPI CELLS # UR: 11 /HPF — HIGH (ref 0–5)
GLUCOSE SERPL-MCNC: 81 MG/DL — SIGNIFICANT CHANGE UP (ref 70–99)
GLUCOSE UR QL: NEGATIVE — SIGNIFICANT CHANGE UP
HCG SERPL QL: NEGATIVE — SIGNIFICANT CHANGE UP
HCT VFR BLD CALC: 36.5 % — LOW (ref 37–47)
HGB BLD-MCNC: 11.3 G/DL — LOW (ref 12–16)
HYALINE CASTS # UR AUTO: 4 /LPF — SIGNIFICANT CHANGE UP (ref 0–7)
IMM GRANULOCYTES NFR BLD AUTO: 0.3 % — SIGNIFICANT CHANGE UP (ref 0.1–0.3)
KETONES UR-MCNC: SIGNIFICANT CHANGE UP
LEUKOCYTE ESTERASE UR-ACNC: NEGATIVE — SIGNIFICANT CHANGE UP
LIDOCAIN IGE QN: 15 U/L — SIGNIFICANT CHANGE UP (ref 7–60)
LYMPHOCYTES # BLD AUTO: 3.93 K/UL — HIGH (ref 1.2–3.4)
LYMPHOCYTES # BLD AUTO: 30.4 % — SIGNIFICANT CHANGE UP (ref 20.5–51.1)
MCHC RBC-ENTMCNC: 26.2 PG — LOW (ref 27–31)
MCHC RBC-ENTMCNC: 31 G/DL — LOW (ref 32–37)
MCV RBC AUTO: 84.5 FL — SIGNIFICANT CHANGE UP (ref 81–99)
MONOCYTES # BLD AUTO: 0.71 K/UL — HIGH (ref 0.1–0.6)
MONOCYTES NFR BLD AUTO: 5.5 % — SIGNIFICANT CHANGE UP (ref 1.7–9.3)
NEUTROPHILS # BLD AUTO: 7.47 K/UL — HIGH (ref 1.4–6.5)
NEUTROPHILS NFR BLD AUTO: 57.7 % — SIGNIFICANT CHANGE UP (ref 42.2–75.2)
NITRITE UR-MCNC: NEGATIVE — SIGNIFICANT CHANGE UP
NRBC # BLD: 0 /100 WBCS — SIGNIFICANT CHANGE UP (ref 0–0)
PH UR: 6 — SIGNIFICANT CHANGE UP (ref 5–8)
PLATELET # BLD AUTO: 317 K/UL — SIGNIFICANT CHANGE UP (ref 130–400)
PMV BLD: 9.6 FL — SIGNIFICANT CHANGE UP (ref 7.4–10.4)
POTASSIUM SERPL-MCNC: 3.8 MMOL/L — SIGNIFICANT CHANGE UP (ref 3.5–5)
POTASSIUM SERPL-SCNC: 3.8 MMOL/L — SIGNIFICANT CHANGE UP (ref 3.5–5)
PROT SERPL-MCNC: 7.1 G/DL — SIGNIFICANT CHANGE UP (ref 6–8)
PROT UR-MCNC: ABNORMAL
RBC # BLD: 4.32 M/UL — SIGNIFICANT CHANGE UP (ref 4.2–5.4)
RBC # FLD: 15 % — HIGH (ref 11.5–14.5)
RBC CASTS # UR COMP ASSIST: 6 /HPF — HIGH (ref 0–4)
SODIUM SERPL-SCNC: 140 MMOL/L — SIGNIFICANT CHANGE UP (ref 135–146)
SP GR SPEC: 1.04 — HIGH (ref 1.01–1.03)
UROBILINOGEN FLD QL: ABNORMAL
WBC # BLD: 12.94 K/UL — HIGH (ref 4.8–10.8)
WBC # FLD AUTO: 12.94 K/UL — HIGH (ref 4.8–10.8)
WBC UR QL: 2 /HPF — SIGNIFICANT CHANGE UP (ref 0–5)

## 2023-05-06 PROCEDURE — 93005 ELECTROCARDIOGRAM TRACING: CPT

## 2023-05-06 PROCEDURE — 81001 URINALYSIS AUTO W/SCOPE: CPT

## 2023-05-06 PROCEDURE — 87086 URINE CULTURE/COLONY COUNT: CPT

## 2023-05-06 PROCEDURE — 80053 COMPREHEN METABOLIC PANEL: CPT

## 2023-05-06 PROCEDURE — 99284 EMERGENCY DEPT VISIT MOD MDM: CPT

## 2023-05-06 PROCEDURE — 36415 COLL VENOUS BLD VENIPUNCTURE: CPT

## 2023-05-06 PROCEDURE — 83690 ASSAY OF LIPASE: CPT

## 2023-05-06 PROCEDURE — 96374 THER/PROPH/DIAG INJ IV PUSH: CPT

## 2023-05-06 PROCEDURE — 99284 EMERGENCY DEPT VISIT MOD MDM: CPT | Mod: 25

## 2023-05-06 PROCEDURE — 84703 CHORIONIC GONADOTROPIN ASSAY: CPT

## 2023-05-06 PROCEDURE — 85025 COMPLETE CBC W/AUTO DIFF WBC: CPT

## 2023-05-06 PROCEDURE — 93010 ELECTROCARDIOGRAM REPORT: CPT

## 2023-05-06 RX ORDER — SODIUM CHLORIDE 9 MG/ML
1000 INJECTION, SOLUTION INTRAVENOUS ONCE
Refills: 0 | Status: COMPLETED | OUTPATIENT
Start: 2023-05-06 | End: 2023-05-06

## 2023-05-06 RX ORDER — ONDANSETRON 8 MG/1
4 TABLET, FILM COATED ORAL ONCE
Refills: 0 | Status: COMPLETED | OUTPATIENT
Start: 2023-05-06 | End: 2023-05-06

## 2023-05-06 RX ADMIN — SODIUM CHLORIDE 1000 MILLILITER(S): 9 INJECTION, SOLUTION INTRAVENOUS at 07:48

## 2023-05-06 RX ADMIN — ONDANSETRON 4 MILLIGRAM(S): 8 TABLET, FILM COATED ORAL at 07:47

## 2023-05-06 NOTE — ED PROVIDER NOTE - OBJECTIVE STATEMENT
Patient is a 22 year old female with PMH of Asthma presenting for abdominal pain. Patient endorses nausea, vomiting, and non-specific abdominal discomfort for the past few days. Patient relates she is presenting to the ED today because she noted a small amount of blood-tinged emesis this morning and it made it her concerned. Patient denies any history of recent travels, TB, OCP, malignancy, chest pain, shortness of breath, DVT/PEs, or additional complaints.

## 2023-05-06 NOTE — ED PROVIDER NOTE - CLINICAL SUMMARY MEDICAL DECISION MAKING FREE TEXT BOX
.    20-year-old female, PMH asthma, p/w dizziness and intermittent vomiting x3 weeks.  Came to emergency department today because vomiting this morning with some blood clots.  No chest pain, shortness of breath, prior abnormal bleeding or bleeding disorder, or blood thinning medication.  Patient has unremarkable exam.    All available lab tests, imaging tests, and EKGs independently reviewed and interpreted by me.  No acute serious pathology found on work-up.  No acute events.      .    Pt stable for dc w/ continued oupt w/up, pmd f/up, and care as discussed.  Pt understands plan and signs and symptoms for ED return. DC home.

## 2023-05-06 NOTE — ED PROVIDER NOTE - PATIENT PORTAL LINK FT
You can access the FollowMyHealth Patient Portal offered by [facility] by registering at the following website: [email or web address]. By joining [facility]’s FollowMyHealth portal, you will also be able to view your health information using other applications (apps) compatible with our system.

## 2023-05-06 NOTE — ED PROVIDER NOTE - NSFOLLOWUPCLINICS_GEN_ALL_ED_FT
[facility]  OB/GYN  [address] [place] [address]  Phone: [phone]  Fax:   Follow Up Time: Routine

## 2023-05-06 NOTE — ED PROVIDER NOTE - CONSIDERATION OF ADMISSION OBSERVATION
Consideration of Admission/Observation Given HPI, PMH, PE, w/up and ED course, admission is not indicated at this time.

## 2023-05-06 NOTE — ED PROVIDER NOTE - PHYSICAL EXAMINATION
VITAL SIGNS: I have reviewed nursing notes and confirm.  CONSTITUTIONAL: Well-appearing, non-toxic, in NAD  SKIN: Warm dry, normal skin turgor  HEAD: NCAT  EYES: No conjunctival injection, scleral anicteric  ENT: Moist mucous membranes, normal pharynx with no erythema or exudates  NECK: Supple; full ROM. Nontender. No cervical LAD  CARD: RRR, no murmurs, rubs or gallops  RESP: Clear to ausculation bilaterally.  No rales, rhonchi, or wheezing.  ABD: Soft, distended, non-tender, no rebound or guarding. No CVA tenderness  EXT: Full ROM, no bony tenderness, no pedal edema, no calf tenderness  NEURO: Normal motor, normal sensory. Normal gait.  PSYCH: Cooperative, appropriate.

## 2023-05-07 LAB
CULTURE RESULTS: SIGNIFICANT CHANGE UP
SPECIMEN SOURCE: SIGNIFICANT CHANGE UP

## 2024-06-13 NOTE — ED PROVIDER NOTE - PHYSICAL EXAMINATION
Detail Level: Simple
Render Risk Assessment In Note?: no
Additional Notes: Discussed risks of hypo/hyper and depigmentation, risk of inefficacy, multiple rounds of treatment and wound care. We discussed cosmetic treatment options with recommendation for test spots. We discussed $250 for 5 and $300 for 10 lesions. Patient today is to pay $50 for test spot of two lesions- this payment will be deducted / applied to final cosmetic cost if he desires follow-up treatment.
Vital Signs: I have reviewed the initial vital signs.  Constitutional: well-nourished, appears stated age, no acute distress  Cardiovascular: regular rate, regular rhythm, well-perfused extremities  Respiratory: unlabored respiratory effort, clear to auscultation bilaterally  Gastrointestinal: soft, non-tender abdomen  Musculoskeletal: supple neck, no lower extremity edema  Integumentary: warm, dry, no rash  Neurologic: awake, alert, extremities’ motor and sensory functions grossly intact  Psychiatric: appropriate mood, appropriate affect

## 2024-09-10 NOTE — ED PROVIDER NOTE - NS ED MD DISPO DISCHARGE
Home Quality 431: Preventive Care And Screening: Unhealthy Alcohol Use - Screening: Patient not identified as an unhealthy alcohol user when screened for unhealthy alcohol use using a systematic screening method Quality 226: Preventive Care And Screening: Tobacco Use: Screening And Cessation Intervention: Patient screened for tobacco use and is an ex/non-smoker Detail Level: Detailed

## 2025-06-29 ENCOUNTER — EMERGENCY (EMERGENCY)
Facility: HOSPITAL | Age: 25
LOS: 0 days | Discharge: HOME | End: 2025-06-29
Attending: EMERGENCY MEDICINE
Payer: COMMERCIAL

## 2025-06-29 VITALS
WEIGHT: 283.96 LBS | HEART RATE: 77 BPM | DIASTOLIC BLOOD PRESSURE: 76 MMHG | RESPIRATION RATE: 16 BRPM | TEMPERATURE: 98 F | OXYGEN SATURATION: 98 % | SYSTOLIC BLOOD PRESSURE: 118 MMHG

## 2025-06-29 DIAGNOSIS — J45.909 UNSPECIFIED ASTHMA, UNCOMPLICATED: ICD-10-CM

## 2025-06-29 DIAGNOSIS — N75.1 ABSCESS OF BARTHOLIN'S GLAND: ICD-10-CM

## 2025-06-29 DIAGNOSIS — N76.89 OTHER SPECIFIED INFLAMMATION OF VAGINA AND VULVA: ICD-10-CM

## 2025-06-29 DIAGNOSIS — Z91.048 OTHER NONMEDICINAL SUBSTANCE ALLERGY STATUS: ICD-10-CM

## 2025-06-29 LAB
APPEARANCE UR: CLEAR — SIGNIFICANT CHANGE UP
BILIRUB UR-MCNC: NEGATIVE — SIGNIFICANT CHANGE UP
COLOR SPEC: YELLOW — SIGNIFICANT CHANGE UP
DIFF PNL FLD: NEGATIVE — SIGNIFICANT CHANGE UP
GLUCOSE UR QL: NEGATIVE MG/DL — SIGNIFICANT CHANGE UP
KETONES UR QL: ABNORMAL MG/DL
LEUKOCYTE ESTERASE UR-ACNC: NEGATIVE — SIGNIFICANT CHANGE UP
NITRITE UR-MCNC: NEGATIVE — SIGNIFICANT CHANGE UP
PH UR: 5.5 — SIGNIFICANT CHANGE UP (ref 5–8)
PROT UR-MCNC: NEGATIVE MG/DL — SIGNIFICANT CHANGE UP
SP GR SPEC: 1.02 — SIGNIFICANT CHANGE UP (ref 1–1.03)
UROBILINOGEN FLD QL: 1 MG/DL — SIGNIFICANT CHANGE UP (ref 0.2–1)

## 2025-06-29 PROCEDURE — 87491 CHLMYD TRACH DNA AMP PROBE: CPT

## 2025-06-29 PROCEDURE — 99284 EMERGENCY DEPT VISIT MOD MDM: CPT | Mod: 25

## 2025-06-29 PROCEDURE — 56420 I&D BARTHOLINS GLAND ABSCESS: CPT

## 2025-06-29 PROCEDURE — 81003 URINALYSIS AUTO W/O SCOPE: CPT

## 2025-06-29 PROCEDURE — 87591 N.GONORRHOEAE DNA AMP PROB: CPT

## 2025-06-29 PROCEDURE — 10061 I&D ABSCESS COMP/MULTIPLE: CPT

## 2025-06-29 RX ORDER — SULFAMETHOXAZOLE/TRIMETHOPRIM 800-160 MG
1 TABLET ORAL
Qty: 14 | Refills: 0
Start: 2025-06-29 | End: 2025-07-05

## 2025-06-29 RX ORDER — OXYCODONE HYDROCHLORIDE AND ACETAMINOPHEN 10; 325 MG/1; MG/1
1 TABLET ORAL ONCE
Refills: 0 | Status: DISCONTINUED | OUTPATIENT
Start: 2025-06-29 | End: 2025-06-29

## 2025-06-29 RX ORDER — KETOROLAC TROMETHAMINE 30 MG/ML
30 INJECTION, SOLUTION INTRAMUSCULAR; INTRAVENOUS ONCE
Refills: 0 | Status: DISCONTINUED | OUTPATIENT
Start: 2025-06-29 | End: 2025-06-29

## 2025-06-29 RX ADMIN — OXYCODONE HYDROCHLORIDE AND ACETAMINOPHEN 1 TABLET(S): 10; 325 TABLET ORAL at 12:05

## 2025-06-29 RX ADMIN — OXYCODONE HYDROCHLORIDE AND ACETAMINOPHEN 1 TABLET(S): 10; 325 TABLET ORAL at 11:38

## 2025-06-29 NOTE — ED PROVIDER NOTE - PROGRESS NOTE DETAILS
pt with bartholin cyst. needle aspiration at bedside showing purulent drainage. abx sent to [facility] and pt to f/u with ob/gyn outpatient.

## 2025-06-29 NOTE — ED PROVIDER NOTE - OBJECTIVE STATEMENT
24-year-old female past medical history of asthma presenting for pain and swelling to vaginal area for 2 days.  Endorsing no discharge from the area however states she regularly gets bilateral discharge secondary to having an implant for contraception.  Endorses last menstrual period was 2 weeks ago.  No fevers, chills, abdominal pain, flank pain, chest pain, shortness of breath.

## 2025-06-29 NOTE — ED PROVIDER NOTE - NSFOLLOWUPINSTRUCTIONS_ED_ALL_ED_FT
Bartholin Cyst    AMBULATORY CARE:    A Bartholin cyst is a lump near the opening to your vagina. You may have pain in this area when you walk or have sex. A Bartholin cyst is caused by blockage of your Bartholin gland. You have a Bartholin gland on each side of your vagina. The glands produce fluid to moisten your vagina. Over time the fluid can build up in the gland and form a cyst. The cyst may become infected. You may be at risk for a Bartholin cyst if you have a sexually transmitted infection. An injury or surgery near your vagina may also increase you risk.    Contact your gynecologist or healthcare provider if:    You have a fever.    Your cyst gets larger or becomes more painful.    Your cyst returns after treatment.    Your drain falls out.    You have pus, redness, or swelling where the cyst was drained.    You have questions or concerns about your condition or care.  Treatment for a Bartholin cyst may include treatment at home. Instead, you may need any of the following:    Medicine may be given to treat or prevent an infection. Medicine may also be given to decrease pain and swelling.    Incision and drainage is a procedure to drain the cyst. Your healthcare provider will make an opening in the cyst so it can drain. He or she may also place packing or a drain in your wound. The drain will help keep your gland open and drain extra fluid. The packing will be removed in 24 to 48 hours. The drain may be left in place for 4 to 6 weeks.    Surgery may be needed if other treatments do not work. Surgery may be done to hold your gland open and prevent another blockage. Surgery may also be done to remove 1 or both of your Bartholin glands.  Self-care if your cyst was not drained:    Take a sitz bath 3 to 4 times each day or as directed. A sitz bath may help relieve swelling and pain. It will also help open your Bartholin glands so they drain normally. Place a clean towel on the bottom of your bath tub. Fill your bath tub with warm water up to your hips. You can also buy a sitz bath that fits in your toilet. Sit in the water for 10 minutes.    Apply a warm compress to your cyst. This may relieve swelling and pain. A warm compress will also help open your Bartholin glands so they drain normally. Wet a washcloth in warm, but not hot, water. Apply the compress for 10 minutes. Repeat 4 times each day.    Keep the area around your vagina clean. Always wipe front to back. Shower once a day. Gently pat the area dry after a shower.  Self-care after an incision and drainage of your cyst:    Take a sitz bath in 24 to 48 hours or as directed. You may need to wait to take a sitz bath until after your packing is removed. A sitz bath may help relieve swelling and pain. Take a sitz bath 3 to 4 times each day for 3 days. Place a clean towel on the bottom of your bath tub. Fill your bath tub with warm water up to your hips. You can also buy a sitz bath that fits in your toilet. Sit in the water for 10 minutes.    Wear a sanitary pad to absorb drainage from your wound. You may have drainage for a few weeks after your cyst is drained.    Ask your healthcare provider if it is okay to have sex. Sex may cause your drain to fall out. It may also increase your risk for an infection.    Keep the area around your vagina clean. Always wipe front to back. Shower once a day. Gently pat the area dry after a shower.

## 2025-06-29 NOTE — ED PROVIDER NOTE - PHYSICAL EXAMINATION
VITAL SIGNS: I have reviewed nursing notes and confirm.  CONSTITUTIONAL: Well-developed; well-nourished; in no acute distress.  SKIN: Skin exam is warm and dry, no acute rash.  HEAD: Normocephalic; atraumatic.  EYES: PERRL, EOM intact; conjunctiva and sclera clear.  ENT: No nasal discharge; airway clear.   CARD: S1, S2 normal; no murmurs, gallops, or rubs. Regular rate and rhythm.  RESP: Normal respiratory effort, no tachypnea or distress. Lungs CTAB, no wheezes, rales or rhonchi.  ABD: soft, NT/ND.  EXT: Normal ROM. No clubbing, cyanosis or edema.   accompanied by Dr. Eden: area of swelling and ttp at left labia 4 o'clock position. no discharge. no lesions. no vaginal discharge or bleeding.   NEURO: Alert, oriented. Grossly unremarkable. No focal deficits.  PSYCH: Cooperative, appropriate.

## 2025-06-29 NOTE — ED PROVIDER NOTE - PATIENT PORTAL LINK FT
You can access the FollowMyHealth Patient Portal offered by [facility] by registering at the following website: [email or web address]. By joining [facility]’s FollowMyHealth portal, you will also be able to view your health information using other applications (apps) compatible with our system.

## 2025-06-29 NOTE — ED ADULT NURSE NOTE - PAIN: ALLEVIATING FACTORS
repositioning/distraction/environment adjustment/immobilization/relaxation/rest/medications, over the counter (OTC)/elevation/deep breathing

## 2025-06-29 NOTE — ED PROVIDER NOTE - DIFFERENTIAL DIAGNOSIS
The differential diagnosis for patients clinical presentation includes but is not limited to:  abscess, cellulitis, folliculitis, herpes Differential Diagnosis

## 2025-06-29 NOTE — ED ADULT NURSE NOTE - NSFALLUNIVINTERV_ED_ALL_ED
Bed/Stretcher in lowest position, wheels locked, appropriate side rails in place/Call bell, personal items and telephone in reach/Instruct patient to call for assistance before getting out of bed/chair/stretcher/Non-slip footwear applied when patient is off stretcher/[place] to call system/Physically safe environment - no spills, clutter or unnecessary equipment/Purposeful proactive rounding/Room/bathroom lighting operational, light cord in reach

## 2025-06-29 NOTE — ED PROVIDER NOTE - CLINICAL SUMMARY MEDICAL DECISION MAKING FREE TEXT BOX
Well appearing female with an isolated cutaneous abscess to her pubic region just above vagina. No signs of herpes. Normal vaginal exam. Normal abdominal exam. Patient spoken to and abscess needle drained to completion. Will DC on antibiotics.    Full DC instructions discussed and patient knows when to seek immediate medical attention.  Patient has proper follow up.  All results discussed and patient aware they may require further work up.  Proper follow up ensured. Limitations of ED work up discussed.  Medications administered and prescribed/OTC home meds discussed.  All questions and concerns from patient or family addressed. Understanding of instructions verbalized.

## 2025-06-29 NOTE — ED PROVIDER NOTE - ATTENDING CONTRIBUTION TO CARE
I personally evaluated patient. I agree with the findings and plan with all documentation on chart except as documented  in my note.    Well appearing female with an isolated cutaneous abscess to her pubic region just above vagina. No signs of herpes. Normal vaginal exam. Normal abdominal exam. Patient spoken to and abscess needle drained to completion. Will DC on antibiotics.    Full DC instructions discussed and patient knows when to seek immediate medical attention.  Patient has proper follow up.  All results discussed and patient aware they may require further work up.  Proper follow up ensured. Limitations of ED work up discussed.  Medications administered and prescribed/OTC home meds discussed.  All questions and concerns from patient or family addressed. Understanding of instructions verbalized.

## 2025-06-30 LAB
C TRACH RRNA SPEC QL NAA+PROBE: SIGNIFICANT CHANGE UP
N GONORRHOEA RRNA SPEC QL NAA+PROBE: SIGNIFICANT CHANGE UP
SPECIMEN SOURCE: SIGNIFICANT CHANGE UP

## 2025-07-05 ENCOUNTER — EMERGENCY (EMERGENCY)
Facility: HOSPITAL | Age: 25
LOS: 0 days | Discharge: HOME | End: 2025-07-05
Attending: STUDENT IN AN ORGANIZED HEALTH CARE EDUCATION/TRAINING PROGRAM
Payer: COMMERCIAL

## 2025-07-05 VITALS
SYSTOLIC BLOOD PRESSURE: 102 MMHG | DIASTOLIC BLOOD PRESSURE: 67 MMHG | TEMPERATURE: 99 F | HEART RATE: 89 BPM | WEIGHT: 283.96 LBS | OXYGEN SATURATION: 100 % | RESPIRATION RATE: 18 BRPM

## 2025-07-05 DIAGNOSIS — K03.81 CRACKED TOOTH: ICD-10-CM

## 2025-07-05 DIAGNOSIS — R22.0 LOCALIZED SWELLING, MASS AND LUMP, HEAD: ICD-10-CM

## 2025-07-05 DIAGNOSIS — K08.89 OTHER SPECIFIED DISORDERS OF TEETH AND SUPPORTING STRUCTURES: ICD-10-CM

## 2025-07-05 DIAGNOSIS — N75.0 CYST OF BARTHOLIN'S GLAND: ICD-10-CM

## 2025-07-05 DIAGNOSIS — Z91.030 BEE ALLERGY STATUS: ICD-10-CM

## 2025-07-05 PROCEDURE — 96372 THER/PROPH/DIAG INJ SC/IM: CPT

## 2025-07-05 PROCEDURE — 99283 EMERGENCY DEPT VISIT LOW MDM: CPT | Mod: 25

## 2025-07-05 PROCEDURE — 99283 EMERGENCY DEPT VISIT LOW MDM: CPT

## 2025-07-05 RX ORDER — KETOROLAC TROMETHAMINE 30 MG/ML
30 INJECTION, SOLUTION INTRAMUSCULAR; INTRAVENOUS ONCE
Refills: 0 | Status: DISCONTINUED | OUTPATIENT
Start: 2025-07-05 | End: 2025-07-05

## 2025-07-05 RX ORDER — AMOXICILLIN 500 MG/1
1 CAPSULE ORAL
Qty: 10 | Refills: 0
Start: 2025-07-05 | End: 2025-07-09

## 2025-07-05 RX ADMIN — KETOROLAC TROMETHAMINE 30 MILLIGRAM(S): 30 INJECTION, SOLUTION INTRAMUSCULAR; INTRAVENOUS at 14:37

## 2025-09-16 ENCOUNTER — EMERGENCY (EMERGENCY)
Facility: HOSPITAL | Age: 25
LOS: 0 days | Discharge: HOME | End: 2025-09-16
Attending: STUDENT IN AN ORGANIZED HEALTH CARE EDUCATION/TRAINING PROGRAM
Payer: COMMERCIAL

## 2025-09-16 VITALS
WEIGHT: 275.58 LBS | HEIGHT: 64 IN | TEMPERATURE: 98 F | HEART RATE: 60 BPM | SYSTOLIC BLOOD PRESSURE: 115 MMHG | OXYGEN SATURATION: 99 % | RESPIRATION RATE: 16 BRPM | DIASTOLIC BLOOD PRESSURE: 76 MMHG

## 2025-09-16 PROCEDURE — 99284 EMERGENCY DEPT VISIT MOD MDM: CPT

## 2025-09-16 PROCEDURE — 73610 X-RAY EXAM OF ANKLE: CPT | Mod: LT

## 2025-09-16 PROCEDURE — 73610 X-RAY EXAM OF ANKLE: CPT | Mod: 26,LT

## 2025-09-16 PROCEDURE — 99283 EMERGENCY DEPT VISIT LOW MDM: CPT | Mod: 25

## 2025-09-16 PROCEDURE — 96372 THER/PROPH/DIAG INJ SC/IM: CPT

## 2025-09-16 RX ORDER — KETOROLAC TROMETHAMINE 30 MG/ML
30 INJECTION, SOLUTION INTRAMUSCULAR; INTRAVENOUS ONCE
Refills: 0 | Status: DISCONTINUED | OUTPATIENT
Start: 2025-09-16 | End: 2025-09-16

## 2025-09-16 RX ADMIN — KETOROLAC TROMETHAMINE 30 MILLIGRAM(S): 30 INJECTION, SOLUTION INTRAMUSCULAR; INTRAVENOUS at 13:49

## 2025-09-17 DIAGNOSIS — Z87.81 PERSONAL HISTORY OF (HEALED) TRAUMATIC FRACTURE: ICD-10-CM
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
## 2025-09-17 DIAGNOSIS — M25.572 PAIN IN LEFT ANKLE AND JOINTS OF LEFT FOOT: ICD-10-CM
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
